# Patient Record
Sex: MALE | Race: WHITE
[De-identification: names, ages, dates, MRNs, and addresses within clinical notes are randomized per-mention and may not be internally consistent; named-entity substitution may affect disease eponyms.]

---

## 2017-02-13 ENCOUNTER — HOSPITAL ENCOUNTER (EMERGENCY)
Dept: HOSPITAL 9 - MADERS | Age: 62
Discharge: HOME | End: 2017-02-13
Payer: SELF-PAY

## 2017-02-13 DIAGNOSIS — M19.072: Primary | ICD-10-CM

## 2017-02-13 DIAGNOSIS — L23.9: ICD-10-CM

## 2017-02-13 DIAGNOSIS — I10: ICD-10-CM

## 2017-02-13 LAB
ALBUMIN SERPL BCG-MCNC: 4.6 G/DL (ref 3.5–5)
ALP SERPL-CCNC: 60 U/L (ref 40–150)
ALT SERPL W P-5'-P-CCNC: 21 U/L (ref 0–55)
ANION GAP SERPL CALC-SCNC: 17 MMOL/L (ref 10–20)
AST SERPL-CCNC: 19 U/L (ref 5–34)
BASOPHILS # BLD AUTO: 0.1 THOU/UL (ref 0–0.2)
BASOPHILS NFR BLD AUTO: 1.7 % (ref 0–1)
BILIRUB SERPL-MCNC: 0.6 MG/DL (ref 0.2–1.2)
BUN SERPL-MCNC: 17 MG/DL (ref 8.4–25.7)
CALCIUM SERPL-MCNC: 9.4 MG/DL (ref 7.8–10.44)
CHLORIDE SERPL-SCNC: 103 MMOL/L (ref 98–107)
CK MB SERPL-MCNC: 1.6 NG/ML (ref 0–6.6)
CO2 SERPL-SCNC: 23 MMOL/L (ref 22–29)
CREAT CL PREDICTED SERPL C-G-VRATE: 0 ML/MIN (ref 70–130)
EOSINOPHIL # BLD AUTO: 0.1 THOU/UL (ref 0–0.7)
EOSINOPHIL NFR BLD AUTO: 2 % (ref 0–10)
GLOBULIN SER CALC-MCNC: 3.3 G/DL (ref 2.4–3.5)
GLUCOSE SERPL-MCNC: 94 MG/DL (ref 70–105)
HGB BLD-MCNC: 14.3 G/DL (ref 14–18)
LYMPHOCYTES # BLD AUTO: 1.4 THOU/UL (ref 1.2–3.4)
LYMPHOCYTES NFR BLD AUTO: 18.8 % (ref 21–51)
MCH RBC QN AUTO: 28.1 PG (ref 27–31)
MCV RBC AUTO: 84.6 FL (ref 80–94)
MONOCYTES # BLD AUTO: 0.6 THOU/UL (ref 0.11–0.59)
MONOCYTES NFR BLD AUTO: 8.6 % (ref 0–10)
NEUTROPHILS # BLD AUTO: 5.1 THOU/UL (ref 1.4–6.5)
NEUTROPHILS NFR BLD AUTO: 69 % (ref 42–75)
PLATELET # BLD AUTO: 299 THOU/UL (ref 130–400)
POTASSIUM SERPL-SCNC: 3.8 MMOL/L (ref 3.5–5.1)
RBC # BLD AUTO: 5.11 MILL/UL (ref 4.7–6.1)
SODIUM SERPL-SCNC: 139 MMOL/L (ref 136–145)
TROPONIN I SERPL DL<=0.01 NG/ML-MCNC: (no result) NG/ML (ref ?–0.03)
URATE SERPL-MCNC: 6.5 MG/DL (ref 3.5–7.2)
WBC # BLD AUTO: 7.4 THOU/UL (ref 4.8–10.8)

## 2017-02-13 PROCEDURE — 84484 ASSAY OF TROPONIN QUANT: CPT

## 2017-02-13 PROCEDURE — 93005 ELECTROCARDIOGRAM TRACING: CPT

## 2017-02-13 PROCEDURE — 84550 ASSAY OF BLOOD/URIC ACID: CPT

## 2017-02-13 PROCEDURE — 86140 C-REACTIVE PROTEIN: CPT

## 2017-02-13 PROCEDURE — 82553 CREATINE MB FRACTION: CPT

## 2017-02-13 PROCEDURE — 85025 COMPLETE CBC W/AUTO DIFF WBC: CPT

## 2017-02-13 PROCEDURE — 80053 COMPREHEN METABOLIC PANEL: CPT

## 2019-02-24 ENCOUNTER — HOSPITAL ENCOUNTER (INPATIENT)
Dept: HOSPITAL 92 - ERS | Age: 64
LOS: 5 days | Discharge: HOME | DRG: 65 | End: 2019-03-01
Attending: FAMILY MEDICINE | Admitting: FAMILY MEDICINE
Payer: COMMERCIAL

## 2019-02-24 VITALS — BODY MASS INDEX: 28.4 KG/M2

## 2019-02-24 DIAGNOSIS — I50.30: ICD-10-CM

## 2019-02-24 DIAGNOSIS — E78.5: ICD-10-CM

## 2019-02-24 DIAGNOSIS — R29.810: ICD-10-CM

## 2019-02-24 DIAGNOSIS — I63.9: Primary | ICD-10-CM

## 2019-02-24 DIAGNOSIS — R47.81: ICD-10-CM

## 2019-02-24 DIAGNOSIS — N17.9: ICD-10-CM

## 2019-02-24 DIAGNOSIS — I11.0: ICD-10-CM

## 2019-02-24 LAB
ALBUMIN SERPL BCG-MCNC: 4.2 G/DL (ref 3.4–4.8)
ALP SERPL-CCNC: 75 U/L (ref 40–150)
ALT SERPL W P-5'-P-CCNC: 23 U/L (ref 8–55)
ANION GAP SERPL CALC-SCNC: 12 MMOL/L (ref 10–20)
ANION GAP SERPL CALC-SCNC: 13 MMOL/L (ref 10–20)
APTT PPP: 28 SEC (ref 22.9–36.1)
APTT PPP: 29 SEC (ref 22.9–36.1)
AST SERPL-CCNC: 15 U/L (ref 5–34)
BASOPHILS # BLD AUTO: 0 THOU/UL (ref 0–0.2)
BASOPHILS NFR BLD AUTO: 0.8 % (ref 0–1)
BILIRUB SERPL-MCNC: 0.3 MG/DL (ref 0.2–1.2)
BUN SERPL-MCNC: 22 MG/DL (ref 8.4–25.7)
BUN SERPL-MCNC: 24 MG/DL (ref 8.4–25.7)
CALCIUM SERPL-MCNC: 10 MG/DL (ref 7.8–10.44)
CALCIUM SERPL-MCNC: 9.7 MG/DL (ref 7.8–10.44)
CHD RISK SERPL-RTO: 5.8 (ref ?–4.5)
CHLORIDE SERPL-SCNC: 105 MMOL/L (ref 98–107)
CHLORIDE SERPL-SCNC: 107 MMOL/L (ref 98–107)
CHOLEST SERPL-MCNC: 214 MG/DL
CK MB SERPL-MCNC: 1.1 NG/ML (ref 0–6.6)
CK SERPL-CCNC: 52 U/L (ref 30–200)
CK SERPL-CCNC: 71 U/L (ref 30–200)
CO2 SERPL-SCNC: 25 MMOL/L (ref 23–31)
CO2 SERPL-SCNC: 29 MMOL/L (ref 23–31)
CREAT CL PREDICTED SERPL C-G-VRATE: 0 ML/MIN (ref 70–130)
CREAT CL PREDICTED SERPL C-G-VRATE: 79 ML/MIN (ref 70–130)
EOSINOPHIL # BLD AUTO: 0.3 THOU/UL (ref 0–0.7)
EOSINOPHIL NFR BLD AUTO: 4.6 % (ref 0–10)
GLOBULIN SER CALC-MCNC: 3.3 G/DL (ref 2.4–3.5)
GLUCOSE SERPL-MCNC: 102 MG/DL (ref 80–115)
GLUCOSE SERPL-MCNC: 116 MG/DL (ref 80–115)
HDLC SERPL-MCNC: 37 MG/DL
HGB BLD-MCNC: 13.5 G/DL (ref 14–18)
INR PPP: 1
INR PPP: 1
LDLC SERPL CALC-MCNC: 148 MG/DL
LYMPHOCYTES # BLD: 1.2 THOU/UL (ref 1.2–3.4)
LYMPHOCYTES NFR BLD AUTO: 19.4 % (ref 21–51)
MCH RBC QN AUTO: 27.3 PG (ref 27–31)
MCV RBC AUTO: 85.3 FL (ref 78–98)
MONOCYTES # BLD AUTO: 0.7 THOU/UL (ref 0.11–0.59)
MONOCYTES NFR BLD AUTO: 11.4 % (ref 0–10)
NEUTROPHILS # BLD AUTO: 4.1 THOU/UL (ref 1.4–6.5)
NEUTROPHILS NFR BLD AUTO: 63.9 % (ref 42–75)
PLATELET # BLD AUTO: 426 THOU/UL (ref 130–400)
POTASSIUM SERPL-SCNC: 3.7 MMOL/L (ref 3.5–5.1)
POTASSIUM SERPL-SCNC: 4 MMOL/L (ref 3.5–5.1)
PROTHROMBIN TIME: 12.8 SEC (ref 12–14.7)
PROTHROMBIN TIME: 13 SEC (ref 12–14.7)
RBC # BLD AUTO: 4.93 MILL/UL (ref 4.7–6.1)
SODIUM SERPL-SCNC: 141 MMOL/L (ref 136–145)
SODIUM SERPL-SCNC: 142 MMOL/L (ref 136–145)
TRIGL SERPL-MCNC: 143 MG/DL (ref ?–150)
TROPONIN I SERPL DL<=0.01 NG/ML-MCNC: (no result) NG/ML (ref ?–0.03)
TROPONIN I SERPL DL<=0.01 NG/ML-MCNC: (no result) NG/ML (ref ?–0.03)
TROPONIN I SERPL DL<=0.01 NG/ML-MCNC: 0.01 NG/ML (ref ?–0.03)
WBC # BLD AUTO: 6.4 THOU/UL (ref 4.8–10.8)

## 2019-02-24 PROCEDURE — 85730 THROMBOPLASTIN TIME PARTIAL: CPT

## 2019-02-24 PROCEDURE — 82550 ASSAY OF CK (CPK): CPT

## 2019-02-24 PROCEDURE — 85610 PROTHROMBIN TIME: CPT

## 2019-02-24 PROCEDURE — 80306 DRUG TEST PRSMV INSTRMNT: CPT

## 2019-02-24 PROCEDURE — 70498 CT ANGIOGRAPHY NECK: CPT

## 2019-02-24 PROCEDURE — 90686 IIV4 VACC NO PRSV 0.5 ML IM: CPT

## 2019-02-24 PROCEDURE — 93306 TTE W/DOPPLER COMPLETE: CPT

## 2019-02-24 PROCEDURE — 80053 COMPREHEN METABOLIC PANEL: CPT

## 2019-02-24 PROCEDURE — 96365 THER/PROPH/DIAG IV INF INIT: CPT

## 2019-02-24 PROCEDURE — 93005 ELECTROCARDIOGRAM TRACING: CPT

## 2019-02-24 PROCEDURE — 84484 ASSAY OF TROPONIN QUANT: CPT

## 2019-02-24 PROCEDURE — 85025 COMPLETE CBC W/AUTO DIFF WBC: CPT

## 2019-02-24 PROCEDURE — 36415 COLL VENOUS BLD VENIPUNCTURE: CPT

## 2019-02-24 PROCEDURE — 70551 MRI BRAIN STEM W/O DYE: CPT

## 2019-02-24 PROCEDURE — G0009 ADMIN PNEUMOCOCCAL VACCINE: HCPCS

## 2019-02-24 PROCEDURE — 80061 LIPID PANEL: CPT

## 2019-02-24 PROCEDURE — 70450 CT HEAD/BRAIN W/O DYE: CPT

## 2019-02-24 PROCEDURE — 70496 CT ANGIOGRAPHY HEAD: CPT

## 2019-02-24 PROCEDURE — 36416 COLLJ CAPILLARY BLOOD SPEC: CPT

## 2019-02-24 PROCEDURE — 90732 PPSV23 VACC 2 YRS+ SUBQ/IM: CPT

## 2019-02-24 PROCEDURE — G0008 ADMIN INFLUENZA VIRUS VAC: HCPCS

## 2019-02-24 PROCEDURE — 90471 IMMUNIZATION ADMIN: CPT

## 2019-02-24 PROCEDURE — 80048 BASIC METABOLIC PNL TOTAL CA: CPT

## 2019-02-24 PROCEDURE — 82553 CREATINE MB FRACTION: CPT

## 2019-02-24 PROCEDURE — 83036 HEMOGLOBIN GLYCOSYLATED A1C: CPT

## 2019-02-24 NOTE — PDOC.EVN
Event Note





- Event Note


Event Note: 





Called to code green at 1930 due to change in neurologic status. Patient 

remained A&O x3 however nurse noticed increased slurring and RUE and RLE 

weakness. R upper and lower extremities 3/5 strength with dysarthria. Facial 

droop at baseline; unchnaged per nurse. Rest of neuro exam unremarkable. NIHSS 

13 (up from 8 at admission). Patient denied any chest pain, headache or other 

symptoms. Vital signs were wnl. Instructions were given to continue neuro 

checks q4hr and repeat CT brain to r/o ICH with labs drawn.

## 2019-02-24 NOTE — PDOC.FPRHP
- History of Present Illness


Chief Complaint: Dysarthria, rt arm weakness


History of Present Illness: 





64 yo M with PMH of HTN and pre-diabetes presents for dysarthria and rt arm 

weakness starting this morning. Patient states he woke up at about 5 am and was 

normal, decided to take a bath. He fell asleep and shortly woke up again and 

had Rt arm weakness, dysarthria, and was "feeling funny." He was hoping it 

would improve on its own. He told his wife and she called 911. States he had 

difficulty getting out of the bathtub. He was transported via helicopter here. 

He states he has not been on any medication at all for the past year as he 

moved here from [place] and did not establish a PCP. He reports dysarthria, 

weakness right hand, and Rt sided facial numbness and right facial droop. He 

denies fever/headache, Chest pain or SOB. 





In helicopter, BP was 200s, he was given labetolol and nitropaste.


In the ED, NIH was 4. CT head neg, CTA head/neck showed severe stenosis in L 

vertebral artery, L mid basilar artery, and Proximal Rt PCA. Pt was started on 

cardine drip for concern for hypertensive emergency and blood pressure 

decreased to 138/87. Discussed TPA risks and benefits with patient, and patient 

did not want TPA.





- Allergies/Adverse Reactions


 Allergies











Allergy/AdvReac Type Severity Reaction Status Date / Time


 


No Known Allergies Allergy   Verified 08/12/18 05:40














- Home Medications


 











 Medication  Instructions  Recorded  Confirmed  Type


 


Dutasteride [Avodart] 0.5 mg PO DAILY #30 cap 08/19/18 02/24/19 Rx


 


Tamsulosin HCl [Flomax] 0.4 mg PO HS #30 cap 08/19/18 02/24/19 Rx


 


Amlodipine [Norvasc] 10 mg PO NOW #14 tab 08/20/18 02/24/19 Rx














- History


PMHx:


 


PSHx: 





FHx:


 


Social:


 








- Review of Systems


General: denies: fever/chills, other (denied headache)


Eyes: denies: eye pain, vision changes


ENT: denies: nasal congestion, rhinorrhea


Respiratory: reports: cough.  denies: congestion, shortness of breath


Cardiovascular: denies: chest pain, palpitation


Gastrointestinal: denies: nausea, vomiting, diarrhea, constipation


Genitourinary: denies: dysuria, other (denies hematuria)


Skin: denies: rashes, lesions


Musculoskeletal: denies: pain


Neurological: reports: numbness (Rt face), weakness (rt arm)


Psychological: denies: anxiety, depression





- Vital signs


BP: [138/87]  HR: [66] RR: [] Tmax: [] Pox: [97]% on [RA]  Wt: [89.7 kg]   








- Physical Exam


Constitutional: NAD, well developed


HEENT: normocephalic and atraumatic, PERRLA, EOMI, conjunctiva clear, grossly 

normal vision, grossly normal hearing, MMM, oropharynx clear, other (No neck 

stiffness)


Neck: supple


-Neck: 





+cervical LAD


Heart: RRR, normal S1/S2, no murmurs/rubs/gallops


Lungs: CTAB, no respiratory distress, good air movement, no rales/rhonchi, no 

wheezing


Abdomen: soft, non-tender, bowel sounds present, no masses/distention


Musculoskeletal: normal structure, normal tone, ROM grossly normal


Neurological: other (NIH of 8. Rt face numbness, slight rt facial droop, 

dysarthria, +Rt arm drift, + Rt arm limb ataxia, +RLE limb ataxia. Strength 3/5 

RUE, 5/5 RLE, 5/5 in LUE and LLE.)


Skin: good turgor, capillary refill <2 seconds, other (Rash on LLE medial heel. 

Scarring on RUE and LUE pt states is from dog attack. Rt thumb missing tip (pt 

states from old accident))


Heme/Lymphatic: no unusual bruising or bleeding


Psychiatric: normal mood and affect, intact recent and remote memory





FMR H&P: Results





- Labs


Result Diagrams: 


 02/24/19 10:59





 02/24/19 10:59


Lab results: 


 











WBC  6.4 thou/uL (4.8-10.8)   02/24/19  10:59    


 


Hgb  13.5 g/dL (14.0-18.0)  L  02/24/19  10:59    


 


Hct  42.1 % (42.0-52.0)   02/24/19  10:59    


 


MCV  85.3 fL (78.0-98.0)   02/24/19  10:59    


 


Plt Count  426 thou/uL (130-400)  H  02/24/19  10:59    


 


Neutrophils %  63.9 % (42.0-75.0)   02/24/19  10:59    


 


Sodium  142 mmol/L (136-145)   02/24/19  10:59    


 


Potassium  3.7 mmol/L (3.5-5.1)   02/24/19  10:59    


 


Chloride  105 mmol/L ()   02/24/19  10:59    


 


Carbon Dioxide  29 mmol/L (23-31)   02/24/19  10:59    


 


BUN  24 mg/dL (8.4-25.7)   02/24/19  10:59    


 


Creatinine  1.11 mg/dL (0.7-1.3)   02/24/19  10:59    


 


Glucose  116 mg/dL ()  H  02/24/19  10:59    


 


Calcium  10.0 mg/dL (7.8-10.44)   02/24/19  10:59    


 


Total Bilirubin  0.3 mg/dL (0.2-1.2)   02/24/19  10:59    


 


AST  15 U/L (5-34)   02/24/19  10:59    


 


ALT  23 U/L (8-55)   02/24/19  10:59    


 


Alkaline Phosphatase  75 U/L ()   02/24/19  10:59    


 


Creatine Kinase  71 U/L ()   02/24/19  10:59    


 


Serum Total Protein  7.5 g/dL (5.8-8.1)   02/24/19  10:59    


 


Albumin  4.2 g/dL (3.4-4.8)   02/24/19  10:59    














- EKG Interpretation


EKG: 





NSR





- Radiology Interpretation


  ** CT scan - head


Status: image reviewed by me


Additional comment: 





Negative for intracranial bleed





  ** Other


Status: image reviewed by me


Additional comment: 





CTA Head/neck: 


Severe stenosis of: L vertebral artery, L mid basilar artery, Prox right PCA





FMR H&P: A/P





- Problem List


(1) Stroke


Current Visit: Yes   Status: Acute   Code(s): I63.9 - CEREBRAL INFARCTION, 

UNSPECIFIED   





(2) History of prediabetes


Current Visit: Yes   Status: Chronic   Code(s): Z87.898 - PERSONAL HISTORY OF 

OTHER SPECIFIED CONDITIONS   





- Plan








Stroke


- Last known normal between 5 and 8 AM


- CT Head- neg


- CTA head/neck: Severe stenosis of: L vertebral artery, L mid basilar artery, 

Prox right PCA


- NIH of 4 in ED, cardine drip started and discontinued


- NIH of 8. Rt face numbness, slight rt facial droop, dysarthria, +Rt arm drift

, + Rt arm limb ataxia, +RLE limb ataxia. Strength 3/5 RUE, 5/5 RLE, 5/5 in LUE 

and LLE.


- Admit to Tele inpatient, q4h neuro checks


- MRI ordered


- Allow permissive HTN, PRNs for BP >220/110


- Started aspirin


- Started statin


- AM FLP pending


- Will consult neuro


- NPO until speech clears


- Speech/OT/PT consulted


- Pt refused TPA in Ed, benefits and risks discussed





Prediabetes


-A1C pending





DVT ppx: lovenox


Code status: full


Diet: NPO until speech clears, then heart healthy


Dispo: admit to tele inpt


PCP: no pcp











FMR H&P: Upper Level





- Plan


Date/Time: 02/24/19 1231





HPI:





This is a 64 yo gentleman presenting with stroke symptoms that started sometime 

between 0500 and 0830 this AM. He states he did not wake up with symptoms when 

he woke up at 0500, he then went to take a bath and states when he was done he 

needed his wife to help him get out of the bath tub and she noted slurring of 

his speech so called 911. 


He has history of htn and dm2 and has not seen a doctor for some time. He 

states he ran out of his meds at least a year ago and has not been taking 

anytihg since that time. He denies smoking or drugs, but did have about 20 

years that he would drink a pack a day of beer. States he quit about 4 years 

ago.








REVIEW OF SYSTEMS:  


Gen: no fever, chills, or sweats


Neuro: no headaches, no numbness/tingling, weakness


Eyes:  no visual changes


ENT: no hearing changes, no sore throat, no runny nose


Resp: no cough, no SOB, no wheeze


Card: no chest pain, no palpitations


GI: no appetite change, no diarrhea or constipation, no nausea/vomiting, no 

blood in stool


: no dysuria, no hematuria, no incontinence, no change in frequency


Skin: no rash, no erythema








PHYSICAL EXAMINATION:


General: NAD, alert and oriented x3


HEENT: PERRLA, EOMI, normal sclera, oropharynx without erythema or exudate


Neck: Supple. Full ROM.


Heart/Cardiovascular System: No r/m/g. RRR. Cap refill < 3 seconds, good pulses 

in all extremities


Lungs/Respiratory System: clear to auscultation bilaterally. No increased work 

of breathing. Room air.


Abdomen/Gastro-Intestinal System: non-tender, normal bowel sounds, no masses, 

no organomegaly


Extremeties: Warm extremities. No cyanosis or edema. 


Neuro: Mild slurring of speech, understandable but slurs at the end of phrases, 

very mild rt sided facial droop at the lips, rt arm drift, does not hit the bed 

but does drop when asked to hold out in front, ataxia of R leg and arm


Psychiatry: Awake, Alert and cooperative with exam


Skin: No lesions, rashes


Musculoskeletal: Full ROM, Strength 5/5 in all 4 extremities 








ASSESSMENTANDPLAN: 





# Probable CVA


-   NIHSS 8


-   Risk factors: poorly controlled HTN, DM


-   Statin, ASA


-   CTA head/neck shows stenosis affecting vertebral art, basilar art, rt PCA. 

Carotids appear clear


-   Will get MRI


-   Neurology consulted appreciate recs


-   Risks/benefits of Tpa discussed with patient, patient opts to avoid tpa


-   Permissive htn 24hrs


-   A1C, FLP


-                      Discussed case with Neurosurg, does not appear to be 

candidate for endovascular intervention





#  HTN


-   does not remember home meds


-   permissive htn 220/110 since no tpa, 24hrs





# DM


-   check A1C








Fluids: LR 100ml/hr


Diet: NPO


Code: Full


PPx: lovenox





PCP: [facility]


Dispo: >2 midnights, inpatient, pending MRI, neuro eval, ST/PT/OT








CONSULTS: Neuro








Addendum - Attending





- Attending Attestation


Date/Time: 02/24/19 9472





I personally evaluated the patient and discussed the management with Dr. SUSANA Gallegos

/JOSE Gallegos.


I agree with the History, Examination, Assessment and Plan documented above 

with any addition or exceptions noted below.





Patient with acute onset of R facial droop, dysarthria, R sided weakness that 

began suddenly this morning in the setting of uncontrolled HTN. CT negative for 

bleed, CTA shows stenosis in multiple vessels. His exam shows mild R facial 

droop, R sided weakness, and dysarthria. Patient will be admitted for presumed 

L distribution CVA. ASA, permissive HTN, statin. Obtain MRI and ECHO. Consult 

Neuro and therapy services. Patient declined tPA during evaluation by ERMD and 

was unsure exactly when deficits began.

## 2019-02-24 NOTE — CT
CT BRAIN WITHOUT CONTRAST: 

 

History: Change in mental status. 

 

Comparison: None. 

 

FINDINGS: 

There is extensive subcortical and deep white matter microvascular ischemic changes. Hypodensity of t
he external capsule on the left as well as the left thalamus. Left thalamic hypodensity appears mildl
y more prominent than on the prior examination. 

 

Calvarium is intact. Paranasal sinuses and mastoids are clear. 

 

IMPRESSION: 

Similar examination of the brain with subtle increased conspicuity in the left thalamic hypodensity a
lthough this was present on the prior exam. No definite new infarction. No hemorrhage. 

 

POS: [facility]

## 2019-02-24 NOTE — CT
CT ANGIOGRAM OF THE HEAD

CT ANGIOGRAM OF THE NECK:

 

HISTORY: 

Stroke alert.  Slurred speech and right-sided weakness, beginning at 8:30 this morning.

 

COMPARISON: 

None.

 

TECHNIQUE: 

CT angiogram of the head and neck is performed in the axial plane.  Three-dimensional reformatted layne
ges are submitted for interpretation.

 

FINDINGS: 

Cortical gray-white matter differentiation is preserved on the postcontrast images.  There is no path
ologic enhancement of the brain parenchyma.

 

Bilateral orbits are unremarkable.  

 

Mild mucosal disease involving the left and right maxillary sinuses.  Bilateral mastoid air cells are
 adequately aerated.  

 

Periodontal disease involving the posterior most right maxillary molar tooth as well as the posterior
 most right mandibular molar tooth.  There is expansion of the right mandible with erosion involving 
the medial and lateral cortex.  No evidence of associated soft tissue abscess.  No obvious masses in 
the oral cavity.  Limited evaluation due to extensive dental amalgam artifact.  Epiglottis has a norm
al caliber.  Preepiglottic fat is preserved.  There is no prevertebral soft tissue swelling.  

 

Symmetric attenuation of the sternocleidomastoid muscles, parotid and submandibular glands.  Unremark
able thyroid gland.

 

No evidence of lymphadenopathy by size criteria.

 

There are varying degrees of central canal stenosis and neural foraminal narrowing on the basis of de
generative change.  Evaluation is limited by technique.

 

Upper mediastinum is unremarkable.  Visualized lung apices have ground-glass opacities, nonspecific.

 

CT ANGIOGRAM:

Visualized aortic arch is unremarkable.  There is mild atherosclerosis of the descending thoracic aor
ta.

 

RIGHT CAROTID:

The right carotid artery origin, common carotid artery, carotid bifurcation, and internal carotid art
kellen have appropriate enhancement and luminal diameter.  There is no significant stenosis based upon N
ASCET criteria.

 

LEFT CAROTID:

The left carotid artery origin, common carotid artery, carotid bifurcation, and internal carotid ariel
ry have appropriate enhancement and luminal diameter.  There is no significant stenosis based upon NA
SCET criteria.  Both cervical vertebral arteries are patent throughout their course in the neck and a
re essentially codominant.  Bilateral subclavian arteries are also patent.

 

CT ANGIOGRAM OF THE HEAD:

There is symmetric enhancement and luminal diameter of the distal cervical and intracranial internal 
carotid arteries.

 

ANTERIOR CIRCULATION:

There is symmetric enhancement in luminal diameter of the M1 segments.  The right A1 segment is sligh
tly smaller on the contralateral side and likely represents a congenital variant.  Proximal A2 segmen
ts are unremarkable.  Proximal left and right MCA branches are also symmetric.  

 

POSTERIOR CIRCULATION:

Both PICA artery origins are unremarkable.  There is short-segment severe stenosis involving the intr
acranial left vertebral artery.  Both vertebral arteries supply the basilar artery.  There is short-s
egment severe stenosis involving the mid portion of the basilar artery.  The distal basilar artery is
 patent.  The left P1 segment has appropriate enhancement and luminal diameter.  The right PCA has a 
fetal origin.  There is short-segment severe stenosis involving the proximal right posterior cerebral
 artery.  

 

IMPRESSION: 

1.  Short-segment severe stenosis involving the mid portion of the basilar artery as well as the prox
imal aspect of the right posterior cerebral artery.

 

2.  No significant stenosis with regards to the anterior [place].

 

3.  No evidence of significant stenosis of either cervical carotid artery based upon NASCET criteria.


 

4.  Results of the study discussed with Dr. Hodge 2/24/2019 at 11:16 a.m.

 

 

CODE CR

 

POS: [facility]

## 2019-02-24 NOTE — PDOC.EVN
Event Note





- Event Note


Event Note: 





Paged to room via Code Green for increase of NIH stroke score from 8 -->13. 

Increased slurring of speech and R arm drifting. Vital signs WNL. Pt oriented 

x2 and not complaining of pain, SOB, chest pain, fever. Does state his R side 

feels weaker than before. Reviewed previous CTA and deficits noted. MRI is 

still pending at this point. Will re-scan his brain via CT to r/o hemorrhage 

and plan for routine MRI. S/p ASA in ED and statin scheduled tonight. Also 

allowing permissive HTN for 24 hrs and overnight. Will plan to continue regular 

neuro checks. Consider higher level of care if vitals become abnormal or 

deficits continue to worsen. Discussed plan with Dr Naik and Dr ROSELIA Carias.





Maynor Reynolds DO

## 2019-02-24 NOTE — CT
HEAD CT WITHOUT CONTRAST:

 

HISTORY: 

Stroke alert.  Last seen normal at 8:30 this morning.  Right-sided weakness.  Slurred speech.  

 

FINDINGS: 

No parenchymal hemorrhage.  No extraaxial hematoma.  No midline shift.  Basilar cisterns are patent. 
 Age-appropriate brain volume.  Cortical gray-white matter differentiation is preserved.

 

No evidence of hydrocephalus.  White matter hypodensities or chronic small-vessel ischemic change.  H
ypoattenuation in the left subinsular cortex suggesting remote white matter change.  

 

Intact calvarium.

 

Adequate aeration of the mastoid air cells.  Mild bilateral maxillary sinus mucosal disease.

 

IMPRESSION: 

No acute intracranial process.

 

Results of the study discussed with Dr. Hodge 2/24/2019 at 11:02 a.m.

 

CODE CR

 

POS: [facility]

## 2019-02-25 LAB
ANION GAP SERPL CALC-SCNC: 11 MMOL/L (ref 10–20)
BASOPHILS # BLD AUTO: 0.1 THOU/UL (ref 0–0.2)
BASOPHILS NFR BLD AUTO: 1 % (ref 0–1)
BUN SERPL-MCNC: 21 MG/DL (ref 8.4–25.7)
CALCIUM SERPL-MCNC: 9.4 MG/DL (ref 7.8–10.44)
CHD RISK SERPL-RTO: 6.8 (ref ?–4.5)
CHLORIDE SERPL-SCNC: 108 MMOL/L (ref 98–107)
CHOLEST SERPL-MCNC: 198 MG/DL
CO2 SERPL-SCNC: 26 MMOL/L (ref 23–31)
CREAT CL PREDICTED SERPL C-G-VRATE: 97 ML/MIN (ref 70–130)
DRUG SCREEN CUTOFF: (no result)
EOSINOPHIL # BLD AUTO: 0.3 THOU/UL (ref 0–0.7)
EOSINOPHIL NFR BLD AUTO: 3.8 % (ref 0–10)
GLUCOSE SERPL-MCNC: 103 MG/DL (ref 80–115)
HDLC SERPL-MCNC: 29 MG/DL
HGB BLD-MCNC: 12.7 G/DL (ref 14–18)
LDLC SERPL CALC-MCNC: 126 MG/DL
LYMPHOCYTES # BLD: 1.3 THOU/UL (ref 1.2–3.4)
LYMPHOCYTES NFR BLD AUTO: 14.5 % (ref 21–51)
MCH RBC QN AUTO: 27.5 PG (ref 27–31)
MCV RBC AUTO: 85.4 FL (ref 78–98)
MEDTOX CONTROL LINE VALID?: (no result)
MEDTOX READER #: (no result)
MONOCYTES # BLD AUTO: 0.8 THOU/UL (ref 0.11–0.59)
MONOCYTES NFR BLD AUTO: 9 % (ref 0–10)
NEUTROPHILS # BLD AUTO: 6.6 THOU/UL (ref 1.4–6.5)
NEUTROPHILS NFR BLD AUTO: 71.7 % (ref 42–75)
PLATELET # BLD AUTO: 384 THOU/UL (ref 130–400)
POTASSIUM SERPL-SCNC: 3.9 MMOL/L (ref 3.5–5.1)
RBC # BLD AUTO: 4.63 MILL/UL (ref 4.7–6.1)
SODIUM SERPL-SCNC: 141 MMOL/L (ref 136–145)
TRIGL SERPL-MCNC: 216 MG/DL (ref ?–150)
WBC # BLD AUTO: 9.2 THOU/UL (ref 4.8–10.8)

## 2019-02-25 NOTE — PDOC.FM
- Subjective


Subjective: 





Patient reports no improvement in symptoms overnight. Says strength and 

dysarthria are the same as admission. Denies any headache, blurry vision, chest 

pain, or SOB. 





- Objective


MAR Reviewed: Yes


Vital Signs & Weight: 


 Vital Signs (12 hours)











  Temp Pulse Pulse Pulse Resp Resp Resp


 


 02/25/19 00:15  98.1 F  75    18  


 


 02/24/19 22:00   66    18  


 


 02/24/19 20:25       


 


 02/24/19 19:26  98.2 F  70    18  


 


 02/24/19 19:21    74  74   20  20














  BP BP BP Pulse Ox Pulse Ox


 


 02/25/19 00:15    170/86 H  95 


 


 02/24/19 22:00    147/74 H  96 


 


 02/24/19 20:25     96 


 


 02/24/19 19:26    165/91 H  95 


 


 02/24/19 19:21  154/89 H  136/84    94 L








 Weight











Weight                         87.317 kg














I&O: 


 











 02/23/19 02/24/19 02/25/19





 06:59 06:59 06:59


 


Intake Total   300


 


Balance   300











Result Diagrams: 


 02/25/19 06:41





 02/24/19 19:31





Phys Exam





- Physical Examination


Constitutional: NAD


HEENT: PERRLA, moist MMs


Neck: supple, full ROM


Respiratory: no wheezing, no rales, no rhonchi, clear to auscultation bilateral


Cardiovascular: RRR, no significant murmur


Gastrointestinal: soft, non-tender


Musculoskeletal: no edema, pulses present


Neurological: normal sensation


3/5 strength in R extremitities w/ mild persistent dysarthria


Psychiatric: normal affect, A&O x 3


Skin: no rash, normal turgor





Dx/Plan


(1) HTN (hypertension)


Code(s): I10 - ESSENTIAL (PRIMARY) HYPERTENSION   Status: Acute   





(2) Stroke


Code(s): I63.9 - CEREBRAL INFARCTION, UNSPECIFIED   Status: Acute   





(3) History of prediabetes


Code(s): Z87.898 - PERSONAL HISTORY OF OTHER SPECIFIED CONDITIONS   Status: 

Chronic   





(4) JENNY (acute kidney injury)


Code(s): N17.9 - ACUTE KIDNEY FAILURE, UNSPECIFIED   Status: Acute   





(5) HLD (hyperlipidemia)


Code(s): E78.5 - HYPERLIPIDEMIA, UNSPECIFIED   Status: Acute   





- Plan


Plan: 





Stroke


- Repeat head CT overnight showed larger area of hypodensity on L thalamus. Pt 

refused TPA in ED after benefits and risks were discussed.


- CTA head/neck showed severe stenosis of L vertebral artery, L mid basilar 

artery, & Prox right PCA.


- MRI pending this AM.


- Stroke team consulted to evaluate patient today. Will likely need rehab upon 

discharge.


- Will start on antihypertensive this AM after 24 hours s/p onset of symptoms. 


- Will continue ASA & statin & consider increasing statin to 80mg QD. 


- Will await recs from neurology who was consulted yesterday for further 

management. 





HLD


- Will continue statin therapy.





HTN


- Aware, patient's BPs extremely elevated on admission. Latest down to 170/86. 

Will initiate PO antihypertensive therapy today.





JENNY


- eGFR & Cr slightly above baseline per chart review. Likely intrinsic in 

origin 2/2 poorly controlled HTN. Will initiate antihypertensive therapy today 

and continue to monitor w/ QD BMPs.  





Prediabetes


-A1C 5.5 on presentation just below level Pre-DM. Will continue HH, low sodium 

diet. 








DVT ppx: lovenox


Code status: full


Diet: HH, low Na


Dispo: MRI today and will consult neurology for acute CVA. 


PCP: no pcp

## 2019-02-25 NOTE — PRG
DATE OF SERVICE:  02/25/2019



Mr. Valadez is a 63-year-old white man, who was admitted with a stroke.  He was

beyond the time for tPA administration.  He has history of hypertension and

prediabetes.  He had an MRI today, which showed an acute left thalamic infarction.

He is being managed conservatively with blood pressure control.  We have added

aspirin and statin and we will continue to follow with Neurology.  OT/PT has been

started. 







Job ID:  [id number]

## 2019-02-25 NOTE — MRI
MRI BRAIN WITHOUT CONTRAST:

 

HISTORY:

Stroke.

 

COMPARISON:

CT brain 02/24/2019.

 

FINDINGS:

On the diffusion-weighted imaging sequences, there is abnormal diffusion restriction within the left 
thalamus.  This is confirmed on the ADC map.

 

No other foci of acute diffusion restriction.

 

On the susceptibility weighted imaging sequences, there are no focal areas of acute hemorrhage.

 

[place] flow voids are maintained.  Moderate microvascular ischemic changes.

 

Evaluation of the clivus is limited due to motion artifact.

 

IMPRESSION:

Acute left thalamic infarction.

 

POS: [facility]

## 2019-02-26 LAB
ANION GAP SERPL CALC-SCNC: 14 MMOL/L (ref 10–20)
BASOPHILS # BLD AUTO: 0.1 THOU/UL (ref 0–0.2)
BASOPHILS NFR BLD AUTO: 0.8 % (ref 0–1)
BUN SERPL-MCNC: 15 MG/DL (ref 8.4–25.7)
CALCIUM SERPL-MCNC: 9.6 MG/DL (ref 7.8–10.44)
CHLORIDE SERPL-SCNC: 102 MMOL/L (ref 98–107)
CO2 SERPL-SCNC: 24 MMOL/L (ref 23–31)
CREAT CL PREDICTED SERPL C-G-VRATE: 97 ML/MIN (ref 70–130)
EOSINOPHIL # BLD AUTO: 0.3 THOU/UL (ref 0–0.7)
EOSINOPHIL NFR BLD AUTO: 3.2 % (ref 0–10)
GLUCOSE SERPL-MCNC: 96 MG/DL (ref 80–115)
HGB BLD-MCNC: 13.9 G/DL (ref 14–18)
LYMPHOCYTES # BLD: 1.2 THOU/UL (ref 1.2–3.4)
LYMPHOCYTES NFR BLD AUTO: 14.2 % (ref 21–51)
MCH RBC QN AUTO: 27.4 PG (ref 27–31)
MCV RBC AUTO: 85.2 FL (ref 78–98)
MONOCYTES # BLD AUTO: 0.8 THOU/UL (ref 0.11–0.59)
MONOCYTES NFR BLD AUTO: 10.1 % (ref 0–10)
NEUTROPHILS # BLD AUTO: 5.9 THOU/UL (ref 1.4–6.5)
NEUTROPHILS NFR BLD AUTO: 71.7 % (ref 42–75)
PLATELET # BLD AUTO: 410 THOU/UL (ref 130–400)
POTASSIUM SERPL-SCNC: 3.8 MMOL/L (ref 3.5–5.1)
RBC # BLD AUTO: 5.09 MILL/UL (ref 4.7–6.1)
SODIUM SERPL-SCNC: 136 MMOL/L (ref 136–145)
WBC # BLD AUTO: 8.2 THOU/UL (ref 4.8–10.8)

## 2019-02-26 RX ADMIN — ASPIRIN SCH MG: 81 TABLET ORAL at 09:25

## 2019-02-26 NOTE — PDOC.FM
- Subjective


Subjective: 





Patient reports that his symptoms are persistent. Denies any headache, blurry 

vision, chest pain, or SOB. 





- Objective


MAR Reviewed: Yes


Vital Signs & Weight: 


 Vital Signs (12 hours)











  Temp Pulse Resp BP BP Pulse Ox


 


 02/26/19 04:00  98.4 F  73  18   157/87 H  96


 


 02/26/19 00:00  98.4 F  78  18   167/97 H  95


 


 02/25/19 20:50       94 L


 


 02/25/19 20:00  98.0 F  75  19   178/98 H  94 L


 


 02/25/19 19:26   75   190/90 H  


 


 02/25/19 18:33      190/90 H 








 Weight











Weight                         87.317 kg














I&O: 


 











 02/24/19 02/25/19 02/26/19





 06:59 06:59 06:59


 


Intake Total  1300 


 


Output Total  200 800


 


Balance  1100 -800











Result Diagrams: 


 02/26/19 05:22





 02/26/19 05:22





Phys Exam





- Physical Examination


Constitutional: NAD


HEENT: PERRLA, moist MMs


Neck: supple, full ROM


Respiratory: no wheezing, no rales, no rhonchi, clear to auscultation bilateral


Cardiovascular: RRR, no significant murmur


Gastrointestinal: positive bowel sounds


4/5 strength in B/L right extremities w/ improved dysarthria


Psychiatric: normal affect, A&O x 3


Skin: no rash, normal turgor





Dx/Plan


(1) HTN (hypertension)


Code(s): I10 - ESSENTIAL (PRIMARY) HYPERTENSION   Status: Acute   





(2) Stroke


Code(s): I63.9 - CEREBRAL INFARCTION, UNSPECIFIED   Status: Acute   





(3) History of prediabetes


Code(s): Z87.898 - PERSONAL HISTORY OF OTHER SPECIFIED CONDITIONS   Status: 

Resolved   





(4) JENNY (acute kidney injury)


Code(s): N17.9 - ACUTE KIDNEY FAILURE, UNSPECIFIED   Status: Resolved   





(5) HLD (hyperlipidemia)


Code(s): E78.5 - HYPERLIPIDEMIA, UNSPECIFIED   Status: Chronic   





- Plan


Plan: 





Stroke


- Repeat head CT overnight showed larger area of hypodensity on L thalamus. Pt 

refused TPA in ED after benefits and risks were discussed.


- CTA head/neck showed severe stenosis of L vertebral artery, L mid basilar 

artery, & Prox right PCA.


- MRI confirmed acute L thalamic ischemic CVA.


- Pt & OT recommended rehab upon discharge; however, patient unfunded. CM on 

board to assist with placement.


- Will continue metoprolol & HCTZ and add a third agent today as patient's BP 

still uncontrolled. 


- Will continue 81 mg ASA & 80mg atorvastatin QD. 


- Neurology on board, appreciate recs.  





HLD


- Will continue statin therapy.





HTN


- Aware, patient's BPs extremely elevated on admission. Had 3 urgency range BPs 

on 2 agents yesterday. Will continue metoprolol and HCTZ BID and consider 

adding a 3rd agent such as lisinopril or amlodipine today. Will avoid 

lisinopril if JENNY still present. 





HfpEF:


Confirmed on TTE done yesterday. 50-55% EF w/ diastolic dysfunction noted. 


- BP meds as described above.





JENNY


- eGFR & Cr pending for today. JENNY likely intrinsic in origin 2/2 poorly 

controlled HTN. Will continue antihypertensive therapy and continue to monitor w

/ QD BMPs.  





Prediabetes


-A1C 5.5 on presentation just below level Pre-DM. Will continue HH, low sodium 

diet. 








DVT ppx: lovenox


Code status: full


Diet: HH, low Na


Dispo: Will continue to tighten BP control. Awaiting recs from  for 

placement.  


PCP: no pcp

## 2019-02-26 NOTE — PRG
DATE OF SERVICE:  02/26/2019



ADDENDUM:  Please add this as an addendum to the note of Dr. Keren Garcia. 



Mr. Valadez is alert and in no distress this morning.  He seems to be moving all

extremities well and is walking in the hallway.  Blood pressure is still not under

great control, but we were making daily adjustments in his medications.  His CBC is

normal.  His chem-7 is normal.  His last blood pressure was elevated at 174/92.  He

is undergoing physical therapy and occupational therapy.  He is on the appropriate

medications, status post stroke, and these will of course be continued to be

monitored. 







Job ID:  [id number]

## 2019-02-26 NOTE — HP
ADDENDUM:  

This is an addendum to the note of Dr. Keren Garcia. 



Mr. Valadez is a 63-year-old white male patient with history of hypertension and

prediabetes, who presented with a stroke. He was on the tPA window and is being

managed expectantly and conservatively. This morning, he is awake and alert. No

acute distress. He is still having some movement disorder and dysarthria. He

underwent MRI today with ____________. 







Job ID:  [id number]

## 2019-02-27 LAB
ANION GAP SERPL CALC-SCNC: 16 MMOL/L (ref 10–20)
BUN SERPL-MCNC: 25 MG/DL (ref 8.4–25.7)
CALCIUM SERPL-MCNC: 10 MG/DL (ref 7.8–10.44)
CHLORIDE SERPL-SCNC: 102 MMOL/L (ref 98–107)
CO2 SERPL-SCNC: 23 MMOL/L (ref 23–31)
CREAT CL PREDICTED SERPL C-G-VRATE: 78 ML/MIN (ref 70–130)
GLUCOSE SERPL-MCNC: 86 MG/DL (ref 80–115)
POTASSIUM SERPL-SCNC: 4 MMOL/L (ref 3.5–5.1)
SODIUM SERPL-SCNC: 137 MMOL/L (ref 136–145)

## 2019-02-27 RX ADMIN — ASPIRIN SCH MG: 81 TABLET ORAL at 10:06

## 2019-02-27 NOTE — PRG
DATE OF SERVICE:  02/27/2019



Mr. Valadez is looking remarkably well.  He is moving all extremities, walking in

the [place], having some speech issues, which have however improved.  His blood

pressure is approaching better levels and we were making dating adjustments in his

medications.  We also appreciate the input from the Neurology Service.  Mr. Valadez

is again mourn to never use methamphetamine again as it could precipitate another

stroke.  Otherwise, continue medical regimens including blood pressure control,

aspirin, and atorvastatin. 







Job ID:  [id number]

## 2019-02-27 NOTE — PDOC.FM
- Subjective


Subjective: 





Patient reports improvement in strength in right side. Denies any headache, 

blurry vision, chest pain, SOB, or N/V/D. Has agreed to follow-up with [facility] 

upon discharge for BP control.





- Objective


MAR Reviewed: Yes


Vital Signs & Weight: 


 Vital Signs (12 hours)











  Temp Pulse Resp BP Pulse Ox


 


 02/27/19 03:56  99.0 F  66  18  150/88 H  96


 


 02/27/19 00:00  98.2 F  79  19  145/96 H  95


 


 02/26/19 21:10      95


 


 02/26/19 20:00  97.9 F  71  18  170/92 H  95








 Weight











Weight                         87.317 kg














I&O: 


 











 02/25/19 02/26/19 02/27/19





 06:59 06:59 06:59


 


Intake Total 1300 300 


 


Output Total 200 1000 350


 


Balance 1100 -700 -350











Result Diagrams: 


 02/26/19 05:22





 02/27/19 05:22





Phys Exam





- Physical Examination


Constitutional: NAD


HEENT: moist MMs


Neck: supple, full ROM


Respiratory: no wheezing, no rales, no rhonchi, clear to auscultation bilateral


Cardiovascular: RRR, no significant murmur


Gastrointestinal: positive bowel sounds


Neurological: moves all 4 limbs


4/5 strength in R extremities w/ significantly improvement in dysarthria


Psychiatric: normal affect, A&O x 3


Skin: no rash, normal turgor





Dx/Plan


(1) HTN (hypertension)


Code(s): I10 - ESSENTIAL (PRIMARY) HYPERTENSION   Status: Acute   





(2) Stroke


Code(s): I63.9 - CEREBRAL INFARCTION, UNSPECIFIED   Status: Acute   





(3) History of prediabetes


Code(s): Z87.898 - PERSONAL HISTORY OF OTHER SPECIFIED CONDITIONS   Status: 

Resolved   





(4) JENNY (acute kidney injury)


Code(s): N17.9 - ACUTE KIDNEY FAILURE, UNSPECIFIED   Status: Resolved   





(5) HLD (hyperlipidemia)


Code(s): E78.5 - HYPERLIPIDEMIA, UNSPECIFIED   Status: Chronic   





- Plan


Plan: 





Stroke


- Second head CT on admission showed larger area of hypodensity on L thalamus. 

Pt refused TPA in ED after benefits and risks were discussed.


- CTA head/neck showed severe stenosis of L vertebral artery, L mid basilar 

artery, & Prox right PCA.


- MRI confirmed acute L thalamic CVA. CVA could be thrombotic and/or 2/2 

vasospasm from meth use. Will treat for thrombotic; however, given stenosis 

seen on CTA.


- Pt & OT recommended rehab upon discharge; however, patient unfunded. CM on 

board to assist with placement. Will likely go home w/ resources and close f/u w

/ [facility] if [facility] bed denied.


- Will continue metoprolol, HCTZ and norvasc & adjust as described below. 


- Will continue 81 mg ASA & 80mg atorvastatin QD. 


- Neurology on board, appreciate recs.  





HLD


- Will continue statin therapy.





HTN


- Aware, patient's BPs extremely elevated on admission. Will continue metoprolol

, HCTZ, & norvasc but will increase norvasc today and possibly metoprolol as BP 

still not at goal. However, no severe/urgency range pressures yesterday so can 

continue to titrate BP meds on outpatient basis.  





HfpEF:


- Confirmed on TTE done yesterday. 50-55% EF w/ diastolic dysfunction noted. 


- BP meds as described above.





JENNY


- Resolved. 


- Will continue antihypertensive therapy and continue to monitor w/ QD BMPs.  





Prediabetes


-A1C 5.5 on presentation just below level Pre-DM. Will continue HH, low sodium 

diet. 








DVT ppx: lovenox


Code status: full


Diet: HH, low Na


Dispo: Will titrate BP meds. Possible d/c home today if denied from rehab 

paramjit bed per patient's preferences.   


PCP: no pcp; will instruct to follow-up w/ [facility]

## 2019-02-27 NOTE — CON
DATE OF CONSULTATION:  02/26/2019



CONSULTING PHYSICIAN:  Hospitalist Service.



IMPRESSION:  

1. 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

2. Vertebral artery and basilar artery stenosis.



PLAN:  

1. Aspirin and statin.

2. The patient will likely be discharged home due to his lack of funding for home

care. 



HISTORY OF PRESENT ILLNESS:  Mr. Valadez is a 63-year-old man, who came in with

acute onset of right-sided weakness and numbness.  He had a positive drug screen for

methamphetamines.  His CTA showed stenosis as noted above.  MRI of the brain showed

a left thalamic stroke.  He has no past history of stroke or other vascular risk

factors. 



PAST MEDICAL HISTORY:  Otherwise negative.



ALLERGIES:  NONE.



SOCIAL HISTORY:  Positive for drug use.



FAMILY HISTORY:  Noncontributory.



REVIEW OF SYSTEMS:  Ten-system review of systems otherwise negative.



PHYSICAL EXAMINATION:

GENERAL:  He is a well-nourished middle-aged man, in no acute distress. 

VITAL SIGNS:  Pulse 70, respirations 20, in normal sinus rhythm. 

HEENT:  Pupils are equal and reactive.  Conjunctivae clear.  Oropharynx clear. 

NECK:  Supple.  No lymphadenopathy. 

EXTREMITIES:  No cyanosis or edema. 

NEUROLOGIC:  Alert and cooperative.  His speech is fluent and clear.  Cranial nerve

exam did not show any facial asymmetries.  Motor exam showed antigravity strength in

the right arm and leg, but his coordination was poor.  Sensation was diminished on

the right side and he is unable to walk independently. 



SUMMARY:  Middle-aged man who suffered a small stroke in the basilar distribution.

It is possible either to be vasospasm or thromboembolic.  Routine treatment and plan

will be undertaken. 







Job ID:  [id number]

## 2019-02-28 LAB
ANION GAP SERPL CALC-SCNC: 15 MMOL/L (ref 10–20)
BUN SERPL-MCNC: 31 MG/DL (ref 8.4–25.7)
CALCIUM SERPL-MCNC: 9.9 MG/DL (ref 7.8–10.44)
CHLORIDE SERPL-SCNC: 101 MMOL/L (ref 98–107)
CO2 SERPL-SCNC: 23 MMOL/L (ref 23–31)
CREAT CL PREDICTED SERPL C-G-VRATE: 73 ML/MIN (ref 70–130)
GLUCOSE SERPL-MCNC: 95 MG/DL (ref 80–115)
POTASSIUM SERPL-SCNC: 3.4 MMOL/L (ref 3.5–5.1)
SODIUM SERPL-SCNC: 136 MMOL/L (ref 136–145)

## 2019-02-28 RX ADMIN — ASPIRIN SCH MG: 81 TABLET ORAL at 09:10

## 2019-03-01 VITALS — SYSTOLIC BLOOD PRESSURE: 131 MMHG | DIASTOLIC BLOOD PRESSURE: 86 MMHG

## 2019-03-01 VITALS — TEMPERATURE: 97.5 F

## 2019-03-01 RX ADMIN — ASPIRIN SCH MG: 81 TABLET ORAL at 08:28

## 2019-03-01 NOTE — DIS
DATE OF ADMISSION:  02/24/2019



DATE OF DISCHARGE:  03/01/2019



RESIDENT:  Keren Garcia MD



CONSULTS:  Neurology, Malvin Guzman MD



PROCEDURES:  

1. CTA of head and neck on 02/24/2019 significant for short segment severe 
stenosis

involving the midportion of the basilar artery as well as the proximal aspect 
of the

right posterior cerebral artery with no significant stenosis with regard to the

anterior [place].  No significant stenosis of either cervical carotid

artery. 

2. Brain CT on 02/24/2019 significant for extensive subcortical and deep white

matter microvascular ischemic changes with a hypodensity of the external 
capsule on

the left as well as the left thalamus.  Left thalamic hypodensity appears mildly

more prominent than on prior examination. 

3. Brain MRI on 02/25/2019, which showed an acute left thalamic infarction.

4. Echocardiogram on 02/25/2019, which noted an ejection fraction estimated at 
50%

to 55% with a flow reversal suggestive of diastolic dysfunction and mild 
tricuspid

and mitral valve regurgitation. 



PRIMARY DIAGNOSES:  

1. Acute ischemic cerebrovascular accident.

2. Hypertensive emergency.

3. Acute kidney injury secondary to hypertensive emergency.



SECONDARY DIAGNOSES:  

1. Hypertension.

2. Hyperlipidemia.



DISCHARGE MEDICATIONS:  

1. Acetaminophen 650 mg p.o. every 6 hours p.r.n. for pain.

2. Aspirin 81 mg daily.

3. Atorvastatin 80 p.o. at bedtime.

4. Hydrochlorothiazide 25 mg p.o. b.i.d.

5. Amlodipine 10 mg p.o. daily.

6. Carvedilol 12.5 mg p.o. b.i.d.



DISCONTINUED MEDICATIONS:  

1. Avodart 0.5 mg p.o. daily.

2. Tamsulosin 0.4 mg p.o. at bedtime.



HOSPITAL COURSE:  The patient is a 63-year-old  gentleman with a past

medical history significant for hypertension and reported prediabetes who 
presented

to the emergency department with a chief complaint of dysarthria and right arm

weakness that he stated began around approximately 5:00 a.m on the date of 
presentation.  

The patient says he remembers falling asleep in the bath tub at home and

upon waking noted right arm weakness, dysarthria, and feeling funny that 
persisted

over the course of the morning.  He therefore instructed his wife to call 911 
as he

had difficulty getting out of the bathtub on his own & was transported to [facility]

Emergency Department via helicopter for a suspected cerebrovascular accident.  
En

route to the emergency department, the patient's blood pressure was noted to be 
in

the 200s so he was given labetalol and nitroglycerin paste.  In the emergency

department, the patient's NIH score was 4 upon arrival & his blood

pressure was still noted to be significantly elevated. He was therefore started

on a Cardene drip for concern for hypertensive emergency and his blood pressure

promptly decreased to a level of 138/87.  All other vitals were within normal

limits.  Routine imaging including a CT head and CTA of the head and neck were

obtained which noted no acute intracranial process and severe stenosis in the 
left

vertebral mid basilar and proximal right posterior cerebral arteries, 
respectively.

The risks and benefits of tPA were discussed with the patient and he refused 
this

medication.  He was therefore admitted to the Stroke Unit for close monitoring

overnight and for routine workup of a presumed acute ischemic CVA. 



The following morning, an MRI of the brain was obtained, which did show an 
acute left thalamic

infarct.  Labwork on admission including a CBC, coag panel, CMP, troponin, and

hemoglobin A1c were all within normal limits.  However, the patient's urine drug

screen came back positive for amphetamines and methamphetamines.  In addition, 
he

was noted to have hyperlipidemia with an elevated total cholesterol of 214 and 
LDL

of 148 and an HDL of 37.  Thus, after 24 hours from the time of onset of 
symptoms,

the patient was started on antihypertensive therapy as well as aspirin and 
statin therapy.

Neurology was also consulted to come and evaluate the patient

and an echocardiogram was obtained, which did note some diastolic dysfunction 
with a

preserved ejection fraction.  The following day, Dr. Malvin Guzman, Neurology, 
came

and evaluated the patient and noted that the patient likely suffered an ischemic

stroke secondary to vasospasm from methamphetamine use given the patient's UDS

results.  However, he did recommend routine treatment with cholesterol-lowering 
and

anti-platelet agents as well as antihypertensive therapy.  The patient 
continued to

work with Physical Therapy and Occupational Therapy over the course of his 
hospital

stay, who recommended inpatient rehabilitation upon discharge.  However, the 
patient

was noted to be uninsured and was denied a [facility] inpatient rehabilitation bed.

Therefore, case management was able to assist the patient in establishing with 
home

health to provide him with [facility] physical and occupational therapy visits 
at home.  His

blood pressure medications were also titrated over the course of his hospital 
stay

so that by the date of discharge, the patient's systolic blood pressures

were consistently in the 130s.  The patient was therefore cleared for discharge 
home

with home health PT and OT with discharge instructions to follow up with [facility] on March 5, 2019 for close monitoring of his blood pressure and 
home

health surveillance. 



DISPOSITION:  Stable.



DISCHARGE INSTRUCTIONS:  

1. Location:  Home.

2. Diet:  Heart healthy diet, low-sodium diet.

3. Activity:  Activity as tolerated, no restrictions.

4. Followup:  The patient was instructed to follow up with [facility]

for a scheduled appointment on March 5, 2019. 







Job ID:  [id number]



MTDD

## 2019-03-01 NOTE — PDOC.FM
- Subjective


Subjective: 





Patient states he feels well this AM. Denies any headache, blurry vision, chest 

pain, N/V/D. 





- Objective


MAR Reviewed: Yes


Vital Signs & Weight: 


 Vital Signs (12 hours)











  Temp Pulse Resp BP Pulse Ox


 


 03/01/19 04:00  97.7 F  105 H  19  173/108 H  98


 


 03/01/19 00:00  98.3 F  93  18  138/87  96


 


 02/28/19 20:00  97.5 F L  73  19  141/88 H  94 L








 Weight











Weight                         87.317 kg














I&O: 


 











 02/27/19 02/28/19 03/01/19





 06:59 06:59 06:59


 


Intake Total 


 


Output Total 475 1025 225


 


Balance -288 -386 105











Result Diagrams: 


 02/26/19 05:22





 02/28/19 05:14





Phys Exam





- Physical Examination


Constitutional: NAD


HEENT: PERRLA, moist MMs


Neck: supple, full ROM


Respiratory: no wheezing, no rales, no rhonchi, clear to auscultation bilateral


Cardiovascular: RRR, no significant murmur


Gastrointestinal: soft, positive bowel sounds


Musculoskeletal: no edema, pulses present


Neurological: moves all 4 limbs


4/5 strength in B/L right extremities 


Psychiatric: normal affect, A&O x 3


Skin: no rash, normal turgor, cap refill <2 seconds





Dx/Plan


(1) HTN (hypertension)


Code(s): I10 - ESSENTIAL (PRIMARY) HYPERTENSION   Status: Acute   





(2) Stroke


Code(s): I63.9 - CEREBRAL INFARCTION, UNSPECIFIED   Status: Acute   


Qualifiers: 


   Laterality of affected vessel: left 





(3) History of prediabetes


Code(s): Z87.898 - PERSONAL HISTORY OF OTHER SPECIFIED CONDITIONS   Status: 

Resolved   





(4) JENNY (acute kidney injury)


Code(s): N17.9 - ACUTE KIDNEY FAILURE, UNSPECIFIED   Status: Resolved   





(5) HLD (hyperlipidemia)


Code(s): E78.5 - HYPERLIPIDEMIA, UNSPECIFIED   Status: Chronic   





- Plan


Plan: 





Acute ischemic stroke


- Second head CT on admission showed larger area of hypodensity on L thalamus. 

Pt refused TPA in ED after benefits and risks were discussed.


- CTA head/neck showed severe stenosis of L vertebral artery, L mid basilar 

artery, & Prox right PCA.


- MRI confirmed acute L thalamic CVA. CVA could be thrombotic and/or 2/2 

vasospasm from meth use. Will treat for thrombotic given stenosis seen on CTA.


- Will continue HCTZ, norvasc & coreg & adjust as described below. 


- Will continue 81 mg ASA & 80mg atorvastatin QD. 


- Neurology on board, appreciate recs.  


- Pt & OT recommended rehab upon discharge; however, patient unfunded. Denied a 

paramjit bed for rehab but accepted for  PT. Unsure date of discharge as 

patient's family is refusing to come and get him from the hospital. Will f/u w/ 

CM regarding transportation status.





HLD


- Will continue statin therapy.





HTN


- Aware, patient's BPs extremely elevated on admission but have drastically 

improved over the course of hospital stay. Will continue coreg, HCTZ, & norvasc 

as DIONY for today as patient only got 1 dose of coreg 12.5 BID yesterday. BP 

still not at goal ranging from 132-159 systolic yesterday. Had 1 elevated BP 

early this AM w/ tachycardia. 


- Will continue to monitor & titrate meds PRN. 





HfpEF:


- Confirmed on TTE done yesterday. 50-55% EF w/ diastolic dysfunction noted. 


- BP meds as described above.





JENNY


- Repeat BMP pending for today. Renal function has declined over last several 

days but will likely improve w/ better controlled BP.


- Will continue antihypertensive therapy and continue to monitor w/ QD BMPs.  





Prediabetes, ruled out


-A1C 5.5 on presentation just below level Pre-DM. Will continue HH, low sodium 

diet. 








DVT ppx: lovenox


Code status: full


Diet: HH, low Na


Dispo: Unsure date of d/c currently. Will f/u w/ CM regarding d/c status. 

Patient to follow-up w/ [facility] on 3/5/19 for BP control.   


PCP: Will instruct to follow-up w/ [facility] as noted above.

## 2019-03-01 NOTE — PRG
DATE OF SERVICE:  03/01/2019



SUBJECTIVE:  Mr. Valadez remains awake and alert.  His blood pressure is assuming

better controlled, being this morning 136/95.  He again is moving all extremities

and able to ambulate.  Ready for discharge. 







Job ID:  [id number]

## 2021-10-18 NOTE — PRG
DATE OF SERVICE:  



ADDENDUM:  This is an addendum to the note of Dr. Keren Garcia. 



Mr. Valadez continues to improve and is ambulating in the [place] without assistance.

His blood pressure is coming under much better control with this morning blood

pressure of 159/98.  We do not anticipate complete normalcy upon discharge.  Home PT

has been arranged.  He will be discharged later today. 







Job ID:  [id number] History of COPD History of COPD History of COPD History of COPD